# Patient Record
Sex: MALE | Race: BLACK OR AFRICAN AMERICAN | NOT HISPANIC OR LATINO
[De-identification: names, ages, dates, MRNs, and addresses within clinical notes are randomized per-mention and may not be internally consistent; named-entity substitution may affect disease eponyms.]

---
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
## 2022-07-29 ENCOUNTER — EMERGENCY (EMERGENCY)
Facility: HOSPITAL | Age: 22
LOS: 1 days | Discharge: HOME | End: 2022-07-29
Attending: EMERGENCY MEDICINE
Payer: MEDICAID

## 2022-07-29 VITALS
WEIGHT: 184.97 LBS | RESPIRATION RATE: 18 BRPM | OXYGEN SATURATION: 98 % | HEIGHT: 72 IN | SYSTOLIC BLOOD PRESSURE: 150 MMHG | DIASTOLIC BLOOD PRESSURE: 76 MMHG | TEMPERATURE: 99 F | HEART RATE: 65 BPM

## 2022-07-29 PROCEDURE — 99283 EMERGENCY DEPT VISIT LOW MDM: CPT

## 2022-07-29 PROCEDURE — 65220 REMOVE FOREIGN BODY FROM EYE: CPT

## 2022-07-29 PROCEDURE — 99282 EMERGENCY DEPT VISIT SF MDM: CPT | Mod: 25

## 2022-07-29 NOTE — ED PROVIDER NOTE - NS ED ATTENDING STATEMENT MOD
This was a shared visit with the MONTY. I reviewed and verified the documentation and independently performed the documented:

## 2022-07-29 NOTE — ED PROVIDER NOTE - PATIENT PORTAL LINK FT
You can access the FollowMyHealth Patient Portal offered by [facility] by registering at the following website: [email or web address]. By joining [employer]’s FollowMyHealth portal, you will also be able to view your health information using other applications (apps) compatible with our system.

## 2022-07-29 NOTE — ED PROVIDER NOTE - OBJECTIVE STATEMENT
22 y/o M c/o R eye foreign body, felt like something went into his eye yesterday afternoon when driving home from work. +FB sensation since that time to right upper outer eyelid. denies vision changes, eye crusting/discharge.

## 2022-07-29 NOTE — ED PROVIDER NOTE - ATTENDING APP SHARED VISIT CONTRIBUTION OF CARE
FB sensation right eye since yesterday; felt something go in eye while driving with window open.  irrigated with copious amount of water but feels something moving around.  PE;  mildly injected conjunctiva, small sandlike FB identified and removed on upper lid eversion, flouro negative uptake.  A/P  Ocular FB:  saline drops as needed every few hours today and tomorrow.

## 2022-07-29 NOTE — ED PROVIDER NOTE - PHYSICAL EXAMINATION
[de-identified] : Reviewed from [facility]. Gen: No acute distress, non toxic  HEENT: Mucous membranes moist, pink conjunctivae, PERRL, EOMI  R upper eyelid everted with hyperemic area and small foreign body removed  woodslamp w fluorescein stain - no uptake seen  Neuro: A&O x 3, moving all 4 extremities  MSK: No spine or joint tenderness to palpation  Skin: No rashes. intact and perfused.

## 2022-07-29 NOTE — ED ADULT TRIAGE NOTE - BMI (KG/M2)
25.1 Subjective   Marilee Taylor is a 58 y.o. male.     Chief Complaint: Flank Pain (left )    Muscle Pain   This is a new (pt states that he had a lot of coughing about 1 week ago and felt a pop in his rib area and has pain and tenderness in the area since then) problem. The current episode started 1 to 4 weeks ago. The problem occurs daily. The problem is unchanged. Pain location: left front rib area. The pain is medium. The symptoms are aggravated by any movement (holding arms up or twisting). Pertinent negatives include no abdominal pain, constipation, diarrhea or vomiting. (Tender to touch; denies SOA) There is no swelling present.        Family History   Problem Relation Age of Onset   • Diabetes Mother    • Hypertension Mother    • Stroke Mother    • Heart disease Mother    • Rheum arthritis Father    • Heart disease Other    • Hypertension Other        Social History     Socioeconomic History   • Marital status: Single     Spouse name: Not on file   • Number of children: Not on file   • Years of education: Not on file   • Highest education level: Not on file   Tobacco Use   • Smoking status: Former Smoker   • Smokeless tobacco: Never Used   Substance and Sexual Activity   • Alcohol use: No     Comment: stopped drinking alcohol   • Drug use: No   • Sexual activity: Defer       Past Medical History:   Diagnosis Date   • Anxiety    • Bipolar disorder (CMS/HCC)    • Depression    • ED (erectile dysfunction)    • Hyperlipidemia    • Hypertension    • PTSD (post-traumatic stress disorder)    • Vitamin B12 deficiency        Review of Systems   Constitutional: Negative.    HENT: Negative.    Respiratory: Negative.    Cardiovascular: Negative.    Gastrointestinal: Negative.  Negative for abdominal pain, constipation, diarrhea and vomiting.   Musculoskeletal: Negative.    Skin: Negative.    Neurological: Negative.    Psychiatric/Behavioral: Negative.        Objective   Physical Exam   Constitutional: He is oriented to  "person, place, and time. He appears well-developed and well-nourished.   Neck: Normal range of motion. Neck supple.   Cardiovascular: Normal rate, regular rhythm and normal heart sounds.   Pulmonary/Chest: Effort normal and breath sounds normal.   Musculoskeletal: Normal range of motion.   Tenderness to palpation to left frontal ribcage   Neurological: He is alert and oriented to person, place, and time.   Skin: Skin is warm and dry.   Psychiatric: He has a normal mood and affect. His behavior is normal. Judgment and thought content normal.   Nursing note and vitals reviewed.      Procedures    Vitals: Blood pressure 138/90, pulse 72, temperature 97.8 °F (36.6 °C), temperature source Oral, height 180.3 cm (71\"), weight 87.1 kg (192 lb), SpO2 99 %.    Allergies:   Allergies   Allergen Reactions   • Demerol Hcl [Meperidine]    • Levaquin [Levofloxacin]    • Morphine And Related    • Prozac [Fluoxetine Hcl]         During this visit the following were done:  Labs Reviewed []    Labs Ordered []    Radiology Reports Reviewed []    Radiology Ordered []    PCP Records Reviewed []    Referring Provider Records Reviewed []    ER Records Reviewed []    Hospital Records Reviewed []    History Obtained From Family []    Radiology Images Reviewed []    Other Reviewed []    Records Requested []      Assessment/Plan   Marilee was seen today for flank pain.    Diagnoses and all orders for this visit:    Muscle strain  -     dexamethasone (DECADRON) injection 8 mg  -     cyclobenzaprine (FLEXERIL) 10 MG tablet; Take 1 tablet by mouth 2 (Two) Times a Day As Needed for Muscle Spasms.              "

## 2023-06-30 ENCOUNTER — APPOINTMENT (OUTPATIENT)
Dept: ORTHOPEDIC SURGERY | Facility: CLINIC | Age: 23
End: 2023-06-30
Payer: COMMERCIAL

## 2023-06-30 VITALS — HEIGHT: 72 IN | WEIGHT: 193 LBS | BODY MASS INDEX: 26.14 KG/M2

## 2023-06-30 DIAGNOSIS — S23.9XXA SPRAIN OF UNSPECIFIED PARTS OF THORAX, INITIAL ENCOUNTER: ICD-10-CM

## 2023-06-30 PROCEDURE — 99203 OFFICE O/P NEW LOW 30 MIN: CPT

## 2023-06-30 PROCEDURE — 72080 X-RAY EXAM THORACOLMB 2/> VW: CPT

## 2023-06-30 RX ORDER — SERTRALINE HYDROCHLORIDE 150 MG/1
CAPSULE ORAL
Refills: 0 | Status: ACTIVE | COMMUNITY
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
## 2023-06-30 NOTE — PHYSICAL EXAM
[] : achilles and patella reflexes are symmetrical [Bilateral] : rib bilaterally [No bony abnormalities] : No bony abnormalities

## 2023-08-11 ENCOUNTER — APPOINTMENT (OUTPATIENT)
Dept: ORTHOPEDIC SURGERY | Facility: CLINIC | Age: 23
End: 2023-08-11
Payer: COMMERCIAL

## 2023-08-11 VITALS — BODY MASS INDEX: 26.14 KG/M2 | WEIGHT: 193 LBS | HEIGHT: 72 IN

## 2023-08-11 PROCEDURE — 99213 OFFICE O/P EST LOW 20 MIN: CPT
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
## 2023-09-22 ENCOUNTER — APPOINTMENT (OUTPATIENT)
Dept: ORTHOPEDIC SURGERY | Facility: CLINIC | Age: 23
End: 2023-09-22

## 2023-09-22 ENCOUNTER — NON-APPOINTMENT (OUTPATIENT)
Age: 23
End: 2023-09-22

## 2023-11-13 ENCOUNTER — APPOINTMENT (OUTPATIENT)
Dept: ORTHOPEDIC SURGERY | Facility: CLINIC | Age: 23
End: 2023-11-13
Payer: COMMERCIAL

## 2023-11-13 VITALS — HEIGHT: 72 IN | BODY MASS INDEX: 26.14 KG/M2 | WEIGHT: 193 LBS

## 2023-11-13 DIAGNOSIS — S23.9XXD SPRAIN OF UNSPECIFIED PARTS OF THORAX, SUBSEQUENT ENCOUNTER: ICD-10-CM

## 2023-11-13 PROCEDURE — 99213 OFFICE O/P EST LOW 20 MIN: CPT

## 2024-04-08 ENCOUNTER — APPOINTMENT (OUTPATIENT)
Dept: ORTHOPEDIC SURGERY | Facility: CLINIC | Age: 24
End: 2024-04-08
Payer: OTHER MISCELLANEOUS

## 2024-04-08 DIAGNOSIS — S33.5XXA SPRAIN OF LIGAMENTS OF LUMBAR SPINE, INITIAL ENCOUNTER: ICD-10-CM

## 2024-04-08 PROCEDURE — 73562 X-RAY EXAM OF KNEE 3: CPT | Mod: RT

## 2024-04-08 PROCEDURE — 72100 X-RAY EXAM L-S SPINE 2/3 VWS: CPT

## 2024-04-08 PROCEDURE — 72170 X-RAY EXAM OF PELVIS: CPT

## 2024-04-08 PROCEDURE — 99204 OFFICE O/P NEW MOD 45 MIN: CPT

## 2024-04-08 NOTE — IMAGING
[No bony abnormalities] : No bony abnormalities [AP] : anteroposterior [Bilateral] : knee bilaterally [There are no fractures, subluxations or dislocations. No significant abnormalities are seen] : There are no fractures, subluxations or dislocations. No significant abnormalities are seen

## 2024-04-08 NOTE — HISTORY OF PRESENT ILLNESS
[Lower back] : lower back [de-identified] : Injured right knee and low back this morning at work- works for [employer], was stepping off truck, slipped on step, went right down, right knee buckled, low back hyperextended. No prior knee issues, occasional back ache in the past. 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 Patient denies numbness/tingling.

## 2024-04-08 NOTE — WORK
[Sprain/Strain] : sprain/strain [Was the competent medical cause of the injury] : was the competent medical cause of the injury [Are consistent with the injury] : are consistent with the injury [Consistent with my objective findings] : consistent with my objective findings [Total (100%)] : total (100%) [Does not reveal pre-existing condition(s) that may affect treatment/prognosis] : does not reveal pre-existing condition(s) that may affect treatment/prognosis [Cannot return to work because ________] : cannot return to work because [unfilled] [Bending/Twisting] : bending/twisting [Climbing stairs/Ladders] : climbing stairs/ladders [Environmental conditions] : environmental conditions [Kneeling] : kneeling [Lifting] : lifting [Walking] : walking [Pulling/Pushing] : pulling/pushing [Unknown at this time] : : unknown at this time [Patient] : patient [No] : No [No Rx restrictions] : No Rx restrictions. [I provided the services listed above] :  I provided the services listed above. [Less than Sedentary Work:] :  Less than Sedentary Work: Unable to meet the requirement of Sedentary Work. [FreeTextEntry1] : good

## 2024-04-08 NOTE — PHYSICAL EXAM
[Right] : right knee [NL (0)] : extension 0 degrees [5___] : hamstring 5[unfilled]/5 [Negative] : negative Genevieve's [] : negative Lachmann [TWNoteComboBox7] : flexion 130 degrees

## 2024-04-15 ENCOUNTER — NON-APPOINTMENT (OUTPATIENT)
Age: 24
End: 2024-04-15

## 2024-04-15 ENCOUNTER — APPOINTMENT (OUTPATIENT)
Dept: ORTHOPEDIC SURGERY | Facility: CLINIC | Age: 24
End: 2024-04-15
Payer: OTHER MISCELLANEOUS

## 2024-04-15 VITALS — BODY MASS INDEX: 26.14 KG/M2 | WEIGHT: 193 LBS | HEIGHT: 72 IN

## 2024-04-15 DIAGNOSIS — S83.91XA SPRAIN OF UNSPECIFIED SITE OF RIGHT KNEE, INITIAL ENCOUNTER: ICD-10-CM

## 2024-04-15 DIAGNOSIS — S33.5XXD SPRAIN OF LIGAMENTS OF LUMBAR SPINE, SUBSEQUENT ENCOUNTER: ICD-10-CM

## 2024-04-15 PROCEDURE — 99213 OFFICE O/P EST LOW 20 MIN: CPT

## 2024-04-15 NOTE — WORK
[Sprain/Strain] : sprain/strain [Was the competent medical cause of the injury] : was the competent medical cause of the injury [Are consistent with the injury] : are consistent with the injury [Consistent with my objective findings] : consistent with my objective findings [Does not reveal pre-existing condition(s) that may affect treatment/prognosis] : does not reveal pre-existing condition(s) that may affect treatment/prognosis [Can return to work without limitations on ______] : can return to work without limitations on [unfilled] [N/A] : : Not Applicable [Patient] : patient [No] : No [No Rx restrictions] : No Rx restrictions. [I provided the services listed above] :  I provided the services listed above. [FreeTextEntry1] : good [Heavy Work:] : Heavy Work: Exerting 50 to 100 pounds of force occasionally, and/or 25 to 50 pounds of force frequently, and/or 10 to 20 pounds of force constantly to move objects. Physical demand requirements are in excess of those for Medium Work

## 2024-04-15 NOTE — HISTORY OF PRESENT ILLNESS
[Lower back] : lower back [de-identified] : Injured right knee and low back this morning at work- works for [employer], was stepping off truck, slipped on step, went right down, right knee buckled, low back hyperextended. No prior knee issues, occasional back ache in the past. 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 Patient denies numbness/tingling.

## 2024-04-15 NOTE — PHYSICAL EXAM
[Right] : right knee [NL (0)] : extension 0 degrees [5___] : hamstring 5[unfilled]/5 [Negative] : negative Genevieve's [] : able to do straight leg raise [TWNoteComboBox7] : flexion 130 degrees

## 2024-04-19 ENCOUNTER — APPOINTMENT (OUTPATIENT)
Dept: ORTHOPEDIC SURGERY | Facility: CLINIC | Age: 24
End: 2024-04-19
Payer: OTHER MISCELLANEOUS

## 2024-04-19 ENCOUNTER — NON-APPOINTMENT (OUTPATIENT)
Age: 24
End: 2024-04-19

## 2024-04-19 VITALS — WEIGHT: 193 LBS | HEIGHT: 72 IN | BODY MASS INDEX: 26.14 KG/M2

## 2024-04-19 DIAGNOSIS — S99.912A UNSPECIFIED INJURY OF LEFT ANKLE, INITIAL ENCOUNTER: ICD-10-CM

## 2024-04-19 PROCEDURE — 99214 OFFICE O/P EST MOD 30 MIN: CPT

## 2024-04-19 PROCEDURE — 73610 X-RAY EXAM OF ANKLE: CPT | Mod: LT

## 2024-04-19 PROCEDURE — L1902: CPT | Mod: LT

## 2024-04-19 PROCEDURE — 73630 X-RAY EXAM OF FOOT: CPT | Mod: LT

## 2024-05-10 ENCOUNTER — NON-APPOINTMENT (OUTPATIENT)
Age: 24
End: 2024-05-10

## 2024-05-10 ENCOUNTER — APPOINTMENT (OUTPATIENT)
Dept: ORTHOPEDIC SURGERY | Facility: CLINIC | Age: 24
End: 2024-05-10
Payer: OTHER MISCELLANEOUS

## 2024-05-10 VITALS — HEIGHT: 72 IN | BODY MASS INDEX: 26.14 KG/M2 | WEIGHT: 193 LBS

## 2024-05-10 PROCEDURE — 99213 OFFICE O/P EST LOW 20 MIN: CPT

## 2024-05-10 NOTE — HISTORY OF PRESENT ILLNESS
[Work related] : work related [FreeTextEntry5] : Reports improvement and that he is attending physical therapy, notes mobility issues in left ankle  [de-identified] : Injured left ankle today, back stepped into a pothole and rolled ankle, felt a pop. Bearing weight with pain [] : no [FreeTextEntry1] : left ankle/foot  [FreeTextEntry3] : 4/19/24

## 2024-05-10 NOTE — PHYSICAL EXAM
[Left] : left foot and ankle [] : no metatarsal tenderness [FreeTextEntry3] : improved [de-identified] : plantar flexion 20 degrees [TWNoteComboBox7] : dorsiflexion 10 degrees [de-identified] : inversion 20 degrees [de-identified] : eversion 10 degrees

## 2024-05-10 NOTE — PHYSICAL EXAM
[Left] : left foot and ankle [] : no metatarsal tenderness [FreeTextEntry3] : improved [TWNoteComboBox7] : dorsiflexion 10 degrees [de-identified] : plantar flexion 20 degrees [de-identified] : inversion 20 degrees [de-identified] : eversion 10 degrees

## 2024-05-10 NOTE — WORK
[Sprain/Strain] : sprain/strain [Was the competent medical cause of the injury] : was the competent medical cause of the injury [Are consistent with the injury] : are consistent with the injury [Consistent with my objective findings] : consistent with my objective findings [Total (100%)] : total (100%) [Does not reveal pre-existing condition(s) that may affect treatment/prognosis] : does not reveal pre-existing condition(s) that may affect treatment/prognosis [Cannot return to work because ________] : cannot return to work because [unfilled] [Climbing stairs/Ladders] : climbing stairs/ladders [Environmental conditions] : environmental conditions [Lifting] : lifting [Operating heavy equipment] : operating heavy equipment [Walking] : walking [Pulling/Pushing] : pulling/pushing [Unknown at this time] : : unknown at this time [Patient] : patient [No] : No [No Rx restrictions] : No Rx restrictions. [I provided the services listed above] :  I provided the services listed above. [FreeTextEntry1] : good [Less than Sedentary Work:] :  Less than Sedentary Work: Unable to meet the requirement of Sedentary Work.

## 2024-05-24 ENCOUNTER — APPOINTMENT (OUTPATIENT)
Dept: ORTHOPEDIC SURGERY | Facility: CLINIC | Age: 24
End: 2024-05-24
Payer: OTHER MISCELLANEOUS

## 2024-05-24 VITALS — HEIGHT: 72 IN | WEIGHT: 193 LBS | BODY MASS INDEX: 26.14 KG/M2

## 2024-05-24 DIAGNOSIS — S93.492A SPRAIN OF OTHER LIGAMENT OF LEFT ANKLE, INITIAL ENCOUNTER: ICD-10-CM

## 2024-05-24 PROCEDURE — 99213 OFFICE O/P EST LOW 20 MIN: CPT

## 2024-05-24 NOTE — HISTORY OF PRESENT ILLNESS
[Work related] : work related [de-identified] : Injured left ankle today, back stepped into a pothole and rolled ankle, felt a pop. Bearing weight with pain [] : no [FreeTextEntry1] : left ankle/foot  [FreeTextEntry3] : 4/19/24 [FreeTextEntry5] : Reports improvement and that he is attending physical therapy, notes mobility issues in left ankle

## 2024-05-24 NOTE — PHYSICAL EXAM
[Left] : left foot and ankle [] : no metatarsal tenderness [FreeTextEntry3] : improved [TWNoteComboBox7] : dorsiflexion 10 degrees [de-identified] : plantar flexion 20 degrees [de-identified] : inversion 20 degrees [de-identified] : eversion 10 degrees

## 2024-05-24 NOTE — REASON FOR VISIT
[FreeTextEntry2] : 05/24/2024 Continues PT, making progress 05/10/2024 f/u left ankle sprain, improving, hasn't done any running/jumping of yet

## 2024-06-11 ENCOUNTER — NON-APPOINTMENT (OUTPATIENT)
Age: 24
End: 2024-06-11

## 2024-06-11 ENCOUNTER — APPOINTMENT (OUTPATIENT)
Dept: ORTHOPEDIC SURGERY | Facility: CLINIC | Age: 24
End: 2024-06-11
Payer: OTHER MISCELLANEOUS

## 2024-06-11 VITALS — HEIGHT: 72 IN | BODY MASS INDEX: 26.14 KG/M2 | WEIGHT: 193 LBS

## 2024-06-11 DIAGNOSIS — S99.912D UNSPECIFIED INJURY OF LEFT ANKLE, SUBSEQUENT ENCOUNTER: ICD-10-CM

## 2024-06-11 DIAGNOSIS — S93.492D SPRAIN OF OTHER LIGAMENT OF LEFT ANKLE, SUBSEQUENT ENCOUNTER: ICD-10-CM

## 2024-06-11 PROCEDURE — 99213 OFFICE O/P EST LOW 20 MIN: CPT

## 2024-06-11 NOTE — ASSESSMENT
[FreeTextEntry1] : continue PT, when visits are complete, will continue HEP He would like to try RTW 6/17/24 full duty Return 4 weeks

## 2024-06-11 NOTE — WORK
[Sprain/Strain] : sprain/strain [Was the competent medical cause of the injury] : was the competent medical cause of the injury [Are consistent with the injury] : are consistent with the injury [Consistent with my objective findings] : consistent with my objective findings [Total (100%)] : total (100%) [Does not reveal pre-existing condition(s) that may affect treatment/prognosis] : does not reveal pre-existing condition(s) that may affect treatment/prognosis [Can return to work without limitations on ______] : can return to work without limitations on [unfilled] [Patient] : patient [No] : No [No Rx restrictions] : No Rx restrictions. [I provided the services listed above] :  I provided the services listed above. [Less than Sedentary Work:] :  Less than Sedentary Work: Unable to meet the requirement of Sedentary Work. [FreeTextEntry1] : good

## 2024-06-11 NOTE — PHYSICAL EXAM
[Left] : left foot and ankle [] : non-antalgic [FreeTextEntry3] : improved [TWNoteComboBox7] : dorsiflexion 10 degrees [de-identified] : plantar flexion 20 degrees [de-identified] : inversion 10 degrees [de-identified] : eversion 20 degrees

## 2024-06-11 NOTE — HISTORY OF PRESENT ILLNESS
[Work related] : work related [de-identified] : 6/11/2024  Continues PT twice a week, making continued gains.  He is now able to run.  Played basketball, had soreness after 10-15 minutes, he has been icing.  Working on balance issue.  05/24/2024 Continues PT, making progress 05/10/2024 f/u left ankle sprain, improving, hasn't done any running/jumping of yet  Injured left ankle today, back stepped into a pothole and rolled ankle, felt a pop. Bearing weight with pain   [] : no [FreeTextEntry1] : left ankle/foot  [FreeTextEntry3] : 4/19/24 [FreeTextEntry5] : Reports improvement and that he is attending physical therapy, notes mobility issues in left ankle

## 2024-07-15 ENCOUNTER — APPOINTMENT (OUTPATIENT)
Dept: ORTHOPEDIC SURGERY | Facility: CLINIC | Age: 24
End: 2024-07-15

## 2024-09-17 ENCOUNTER — NON-APPOINTMENT (OUTPATIENT)
Age: 24
End: 2024-09-17